# Patient Record
Sex: MALE | Race: WHITE | NOT HISPANIC OR LATINO | URBAN - METROPOLITAN AREA
[De-identification: names, ages, dates, MRNs, and addresses within clinical notes are randomized per-mention and may not be internally consistent; named-entity substitution may affect disease eponyms.]

---

## 2024-05-10 ENCOUNTER — APPOINTMENT (RX ONLY)
Dept: URBAN - METROPOLITAN AREA CLINIC 66 | Facility: CLINIC | Age: 19
Setting detail: DERMATOLOGY
End: 2024-05-10

## 2024-05-10 DIAGNOSIS — M67.4 GANGLION: ICD-10-CM

## 2024-05-10 DIAGNOSIS — B07.8 OTHER VIRAL WARTS: ICD-10-CM

## 2024-05-10 PROBLEM — M67.441 GANGLION, RIGHT HAND: Status: ACTIVE | Noted: 2024-05-10

## 2024-05-10 PROCEDURE — 99202 OFFICE O/P NEW SF 15 MIN: CPT | Mod: 25

## 2024-05-10 PROCEDURE — ? ADDITIONAL NOTES

## 2024-05-10 PROCEDURE — ? COUNSELING

## 2024-05-10 PROCEDURE — 17110 DESTRUCTION B9 LES UP TO 14: CPT

## 2024-05-10 PROCEDURE — ? LIQUID NITROGEN

## 2024-05-10 ASSESSMENT — LOCATION DETAILED DESCRIPTION DERM
LOCATION DETAILED: RIGHT RADIAL DORSAL HAND
LOCATION DETAILED: LEFT DISTAL VENTRAL THUMB

## 2024-05-10 ASSESSMENT — LOCATION ZONE DERM
LOCATION ZONE: FINGER
LOCATION ZONE: HAND

## 2024-05-10 ASSESSMENT — LOCATION SIMPLE DESCRIPTION DERM
LOCATION SIMPLE: LEFT THUMB
LOCATION SIMPLE: RIGHT HAND

## 2024-05-10 NOTE — PROCEDURE: LIQUID NITROGEN
Medical Necessity Information: It is in your best interest to select a reason for this procedure from the list below. All of these items fulfill various CMS LCD requirements except the new and changing color options.
Consent: The patient's consent was obtained including but not limited to risks of crusting, scabbing, blistering, scarring, darker or lighter pigmentary change, recurrence, incomplete removal and infection.
Show Topical Anesthesia Variable?: Yes
Spray Paint Technique: No
Spray Paint Text: The liquid nitrogen was applied to the skin utilizing a spray paint frosting technique.
Medical Necessity Clause: This procedure was medically necessary because the lesions that were treated were:
Post-Care Instructions: I reviewed with the patient in detail post-care instructions. Patient is to wear sunprotection, and avoid picking at any of the treated lesions. Pt may apply Vaseline to crusted or scabbing areas.
Detail Level: Detailed

## 2024-06-24 ENCOUNTER — APPOINTMENT (RX ONLY)
Dept: URBAN - METROPOLITAN AREA CLINIC 66 | Facility: CLINIC | Age: 19
Setting detail: DERMATOLOGY
End: 2024-06-24

## 2024-06-24 DIAGNOSIS — B07.8 OTHER VIRAL WARTS: ICD-10-CM

## 2024-06-24 PROCEDURE — ? LIQUID NITROGEN

## 2024-06-24 PROCEDURE — 17110 DESTRUCTION B9 LES UP TO 14: CPT

## 2024-06-24 PROCEDURE — ? COUNSELING

## 2024-06-24 ASSESSMENT — LOCATION DETAILED DESCRIPTION DERM
LOCATION DETAILED: LEFT DISTAL VENTRAL THUMB
LOCATION DETAILED: LEFT PROXIMAL ULNAR PALMAR SMALL FINGER

## 2024-06-24 ASSESSMENT — LOCATION SIMPLE DESCRIPTION DERM
LOCATION SIMPLE: LEFT SMALL FINGER
LOCATION SIMPLE: LEFT THUMB

## 2024-06-24 ASSESSMENT — LOCATION ZONE DERM: LOCATION ZONE: FINGER

## 2024-08-01 ENCOUNTER — APPOINTMENT (RX ONLY)
Dept: URBAN - METROPOLITAN AREA CLINIC 66 | Facility: CLINIC | Age: 19
Setting detail: DERMATOLOGY
End: 2024-08-01

## 2024-08-01 DIAGNOSIS — B07.8 OTHER VIRAL WARTS: ICD-10-CM

## 2024-08-01 PROCEDURE — ? LIQUID NITROGEN

## 2024-08-01 PROCEDURE — 17110 DESTRUCTION B9 LES UP TO 14: CPT

## 2024-08-01 PROCEDURE — ? COUNSELING

## 2024-08-01 ASSESSMENT — LOCATION SIMPLE DESCRIPTION DERM
LOCATION SIMPLE: LEFT THUMB
LOCATION SIMPLE: LEFT SMALL FINGER

## 2024-08-01 ASSESSMENT — LOCATION ZONE DERM: LOCATION ZONE: FINGER

## 2024-09-09 ENCOUNTER — APPOINTMENT (RX ONLY)
Dept: URBAN - METROPOLITAN AREA CLINIC 66 | Facility: CLINIC | Age: 19
Setting detail: DERMATOLOGY
End: 2024-09-09

## 2024-09-09 DIAGNOSIS — B07.8 OTHER VIRAL WARTS: ICD-10-CM

## 2024-09-09 PROCEDURE — 99212 OFFICE O/P EST SF 10 MIN: CPT

## 2024-09-09 PROCEDURE — ? ADDITIONAL NOTES

## 2024-09-09 PROCEDURE — ? COUNSELING

## 2024-09-09 ASSESSMENT — LOCATION DETAILED DESCRIPTION DERM
LOCATION DETAILED: LEFT PROXIMAL PALMAR SMALL FINGER
LOCATION DETAILED: LEFT PROXIMAL PALMAR SMALL FINGER
LOCATION DETAILED: LEFT DISTAL VENTRAL THUMB
LOCATION DETAILED: LEFT DISTAL VENTRAL THUMB

## 2024-09-09 ASSESSMENT — LOCATION SIMPLE DESCRIPTION DERM
LOCATION SIMPLE: LEFT THUMB
LOCATION SIMPLE: LEFT SMALL FINGER
LOCATION SIMPLE: LEFT SMALL FINGER
LOCATION SIMPLE: LEFT THUMB

## 2024-09-09 ASSESSMENT — LOCATION ZONE DERM
LOCATION ZONE: FINGER
LOCATION ZONE: FINGER

## 2024-09-09 NOTE — PROCEDURE: ADDITIONAL NOTES
Additional Notes: At this time there is some mild thickening of the skin still present but it is difficult to tell if this is still active wart vs reactive from the prior LN2.  I discussed options with pt including treating again with LN2 vs observing and calling / coming back in if they grow back.  He elects for monitoring and will return if he notices warts regrowing.
Render Risk Assessment In Note?: no
Detail Level: Simple